# Patient Record
Sex: MALE | Employment: STUDENT | URBAN - METROPOLITAN AREA
[De-identification: names, ages, dates, MRNs, and addresses within clinical notes are randomized per-mention and may not be internally consistent; named-entity substitution may affect disease eponyms.]

---
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
## 2018-02-03 ENCOUNTER — HOSPITAL ENCOUNTER (EMERGENCY)
Facility: CLINIC | Age: 18
Discharge: HOME | End: 2018-02-03
Attending: EMERGENCY MEDICINE | Admitting: EMERGENCY MEDICINE
Payer: COMMERCIAL

## 2018-02-03 VITALS
SYSTOLIC BLOOD PRESSURE: 117 MMHG | TEMPERATURE: 99.4 F | HEART RATE: 73 BPM | WEIGHT: 190 LBS | DIASTOLIC BLOOD PRESSURE: 48 MMHG | OXYGEN SATURATION: 94 % | RESPIRATION RATE: 18 BRPM | BODY MASS INDEX: 21.98 KG/M2 | HEIGHT: 78 IN

## 2018-02-03 DIAGNOSIS — J10.1 INFLUENZA A: ICD-10-CM

## 2018-02-03 LAB
DEPRECATED S PYO AG THROAT QL EIA: NORMAL
FLUAV+FLUBV AG SPEC QL: NEGATIVE
FLUAV+FLUBV AG SPEC QL: POSITIVE
SPECIMEN SOURCE: ABNORMAL
SPECIMEN SOURCE: NORMAL

## 2018-02-03 PROCEDURE — 87880 STREP A ASSAY W/OPTIC: CPT | Performed by: EMERGENCY MEDICINE

## 2018-02-03 PROCEDURE — 99284 EMERGENCY DEPT VISIT MOD MDM: CPT | Mod: Z6 | Performed by: EMERGENCY MEDICINE

## 2018-02-03 PROCEDURE — 87081 CULTURE SCREEN ONLY: CPT | Performed by: EMERGENCY MEDICINE

## 2018-02-03 PROCEDURE — 25000132 ZZH RX MED GY IP 250 OP 250 PS 637: Performed by: EMERGENCY MEDICINE

## 2018-02-03 PROCEDURE — 99283 EMERGENCY DEPT VISIT LOW MDM: CPT | Performed by: EMERGENCY MEDICINE

## 2018-02-03 PROCEDURE — 87804 INFLUENZA ASSAY W/OPTIC: CPT | Mod: 91 | Performed by: EMERGENCY MEDICINE

## 2018-02-03 RX ORDER — ACETAMINOPHEN 500 MG
1000 TABLET ORAL ONCE
Status: COMPLETED | OUTPATIENT
Start: 2018-02-03 | End: 2018-02-03

## 2018-02-03 RX ORDER — ACETAMINOPHEN 500 MG
500 TABLET ORAL ONCE
Status: DISCONTINUED | OUTPATIENT
Start: 2018-02-03 | End: 2018-02-03

## 2018-02-03 RX ADMIN — ACETAMINOPHEN 1000 MG: 500 TABLET ORAL at 14:02

## 2018-02-03 ASSESSMENT — ENCOUNTER SYMPTOMS
VOICE CHANGE: 0
NAUSEA: 1
TROUBLE SWALLOWING: 0
COUGH: 1
SORE THROAT: 1
CARDIOVASCULAR NEGATIVE: 1
VOMITING: 0

## 2018-02-03 NOTE — ED AVS SNAPSHOT
[facility]    [address] [address]    Phone:  [phone]    Fax:  [phone]                                       Colby Rao   MRN: [id number]    Department:  [facility]   Date of Visit:  2/3/2018           Patient Information     Date Of Birth          2000        Your diagnoses for this visit were:     Influenza A        You were seen by Adam Cam MD.      Follow-up Information     Follow up with [facility].    Specialty:  EMERGENCY MEDICINE    Why:  If symptoms worsen    Contact information:    [address] [place] [address]  [phone]    Additional information:    The medical center is located at   [address] [place] [address]. (between [address]   [address] in [place], four miles north   of [place]).        Discharge Instructions         The Flu (Influenza)     The virus that causes the flu spreads through the air in droplets when someone who has the flu coughs, sneezes, laughs, or talks.   The flu (influenza) is an infection that affects your respiratory tract. This tract is made up of your mouth, nose, and lungs, and the passages between them. Unlike a cold, the flu can make you very ill. And it can lead to pneumonia, a serious lung infection. The flu can have serious complications and even cause death.  Who is at risk for the flu?  Anyone can get the flu. But you are more likely to become infected if you:    Have a weakened immune system    Work in a healthcare setting where you may be exposed to flu germs    Live or work with someone who has the flu    Haven t had an annual flu shot  How does the flu spread?  The flu is caused by a virus. The virus spreads through the air in droplets when someone who has the flu coughs, sneezes, laughs, or talks. You can become infected when you inhale these viruses directly. You can also become infected when you touch a surface on which  the droplets have landed and then transfer the germs to your eyes, nose, or mouth. Touching used tissues, or sharing utensils, drinking glasses, or a toothbrush from an infected person can expose you to flu viruses, too.  What are the symptoms of the flu?  Flu symptoms tend to come on quickly and may last a few days to a few weeks. They include:    Fever usually higher than 100.4 F  (38 C) and chills    Sore throat and headache    Dry cough    Runny nose    Tiredness and weakness    Muscle aches  Who is at risk for flu complications?  For some people, the flu can be very serious. The risk for complications is greater for:    Children younger than age 5    Adults ages 65 and older    People with a chronic illness such as diabetes or heart, kidney, or lung disease    People who live in a nursing home or long-term care facility   How is the flu treated?  The flu usually gets better after 7 days or so. In some cases, your healthcare provider may prescribe an antiviral medicine. This may help you get well a little sooner. For the medicine to help, you need to take it as soon as possible (ideally within 48 hours) after your symptoms start. If you develop pneumonia or other serious illness, you may need to stay in the hospital.  Easing flu symptoms    Drink lots of fluids such as water, juice, and warm soup. A good rule is to drink enough so that you urinate your normal amount.    Get plenty of rest.    Ask your healthcare provider what to take for fever and pain.    Call your provider if your fever is 100.4 F (38 C) or higher, or you become dizzy, lightheaded, or short of breath.  Taking steps to protect others    Wash your hands often, especially after coughing or sneezing. Or clean your hands with an alcohol-based hand  containing at least 60% alcohol.    Cough or sneeze into a tissue. Then throw the tissue away and wash your hands. If you don t have a tissue, cough and sneeze into your elbow.    Stay home  until at least 24 hours after you no longer have a fever or chills. Be sure the fever isn t being hidden by fever-reducing medicine.    Don t share food, utensils, drinking glasses, or a toothbrush with others.    Ask your healthcare provider if others in your household should get antiviral medicine to help them avoid infection.  How can the flu be prevented?    One of the best ways to avoid the flu is to get a flu vaccine each year. The virus that causes the flu changes from year to year. For that reason, healthcare providers recommend getting the flu vaccine each year, as soon as it's available in your area. The vaccine is given as a shot. Your healthcare provider can tell you which vaccine is right for you. A nasal spray is also available but is not recommended for the 1736-2519 flu season. The CDC says this is because the nasal spray did not seem to protect against the flu over the last several flu seasons. In the past, it was meant for people ages 2 to 49.    Wash your hands often. Frequent handwashing is a proven way to help prevent infection.    Carry an alcohol-based hand gel containing at least 60% alcohol. Use it when you can't use soap and water. Then wash your hands as soon as you can.    Avoid touching your eyes, nose, and mouth.    At home and work, clean phones, computer keyboards, and toys often with disinfectant wipes.    If possible, avoid close contact with others who have the flu or symptoms of the flu.  Handwashing tips  Handwashing is one of the best ways to prevent many common infections. If you are caring for or visiting someone with the flu, wash your hands each time you enter and leave the room. Follow these steps:    Use warm water and plenty of soap. Rub your hands together well.    Clean the whole hand, including under your nails, between your fingers, and up the wrists.    Wash for at least 15 seconds.    Rinse, letting the water run down your fingers, not up your wrists.    Dry your  hands well. Use a paper towel to turn off the faucet and open the door.  Using alcohol-based hand   Alcohol-based hand  are also a good choice. Use them when you can't use soap and water. Follow these steps:    Squeeze about a tablespoon of gel into the palm of one hand.    Rub your hands together briskly, cleaning the backs of your hands, the palms, between your fingers, and up the wrists.    Rub until the gel is gone and your hands are completely dry.  Preventing the flu in healthcare settings  The flu is a special concern for people in hospitals and long-term care facilities. To help prevent the spread of flu, many hospitals and nursing homes take these steps:    Healthcare providers wash their hands or use an alcohol-based hand  before and after treating each patient.    People with the flu have private rooms and bathrooms or share a room with someone with the same infection.    People who are at high risk for the flu but don't have it are encouraged to get the flu and pneumonia vaccines.    All healthcare workers are encouraged or required to get flu shots.   Date Last Reviewed: 12/1/2016 2000-2017 [employer]. [address] [place] [address]. All rights reserved. This information is not intended as a substitute for professional medical care. Always follow your healthcare professional's instructions.          24 Hour Appointment Hotline       To make an appointment at any [facility], call [phone] ([phone]). If you don't have a family doctor or clinic, we will help you find one. [facility] are conveniently located to serve the needs of you and your family.             Review of your medicines      Our records show that you are taking the medicines listed below. If these are incorrect, please call your family doctor or clinic.        Dose / Directions Last dose taken    loratadine 10 MG tablet   Commonly known as:  CLARITIN   Dose:  10 mg         Take 10 mg by mouth daily as needed Reported on 2/13/2017   Refills:  0        MULTIVITAMIN PO   Dose:  1 tablet        Take 1 tablet by mouth   Refills:  0        OMEGA-3 FISH OIL PO   Dose:  2 capsule        Take 2 capsules by mouth daily   Refills:  0        VITAMIN D (CHOLECALCIFEROL) PO        Take by mouth daily 1 capsule   Refills:  0                Procedures and tests performed during your visit     Beta strep group A culture    Influenza A/B antigen    Rapid Strep Screen      Orders Needing Specimen Collection     None      Pending Results     Date and Time Order Name Status Description    2/3/2018 1310 Beta strep group A culture In process             Pending Culture Results     Date and Time Order Name Status Description    2/3/2018 1310 Beta strep group A culture In process             Pending Results Instructions     If you had any lab results that were not finalized at the time of your Discharge, you can call the ED Lab Result RN at [phone]. You will be contacted by this team for any positive Lab results or changes in treatment. The nurses are available 7 days a week from 10A to 6:30P.  You can leave a message 24 hours per day and they will return your call.        Test Results From Your Hospital Stay        2/3/2018  2:04 PM      Component Results     Component    Specimen Description    Throat    Rapid Strep A Screen    NEGATIVE: No Group A streptococcal antigen detected by immunoassay, await culture report.         2/3/2018  2:13 PM      Component Results     Component Value Ref Range & Units Status    Influenza A/B Agn Specimen Nasal  Final    Influenza A Positive (A) NEG^Negative Final    Influenza B Negative NEG^Negative Final    Test results must be correlated with clinical data. If necessary, results   should be confirmed by a molecular assay or viral culture.           2/3/2018  2:23 PM                Thank you for choosing [facility]       Thank you for choosing [facility] for your  "care. Our goal is always to provide you with excellent care. Hearing back from our patients is one way we can continue to improve our services. Please take a few minutes to complete the written survey that you may receive in the mail after you visit with us. Thank you!        [facility] [facility] Information     [facility] [facility] lets you send messages to your doctor, view your test results, renew your prescriptions, schedule appointments and more. To sign up, go to [email or web address] . Click on \"Log in\" on the left side of the screen, which will take you to the Welcome page. Then click on \"Sign up Now\" on the right side of the page.     You will be asked to enter the access code listed below, as well as some personal information. Please follow the directions to create your username and password.     Your access code is: [email or web address]  Expires: 2018  3:05 PM     Your access code will  in 90 days. If you need help or a new code, please call your [place] clinic or [phone].        Care EveryWhere ID     This is your Care EveryWhere ID. This could be used by other organizations to access your [place] medical records  [id number]        Equal Access to Services     NIIK LOPEZ AH: Mumtaz Ford, sean krishna, joshua richardson, tulio aguillon. So [facility] [phone].    ATENCIÓN: Si habla español, tiene a pino disposición servicios gratuitos de asistencia lingüística. Willa al [phone].    We comply with applicable federal civil rights laws and [place] laws. We do not discriminate on the basis of race, color, national origin, age, disability, sex, sexual orientation, or gender identity.            After Visit Summary       This is your record. Keep this with you and show to your community pharmacist(s) and doctor(s) at your next visit.                  "

## 2018-02-03 NOTE — ED AVS SNAPSHOT
[facility]    [address] [address]    Phone:  [phone]    Fax:  [phone]                                       Colby Rao   MRN: [id number]    Department:  [facility]   Date of Visit:  2/3/2018           After Visit Summary Signature Page     I have received my discharge instructions, and my questions have been answered. I have discussed any challenges I see with this plan with the nurse or doctor.    ..........................................................................................................................................  Patient/Patient Representative Signature      ..........................................................................................................................................  Patient Representative Print Name and Relationship to Patient    ..................................................               ................................................  Date                                            Time    ..........................................................................................................................................  Reviewed by Signature/Title    ...................................................              ..............................................  Date                                                            Time

## 2018-02-03 NOTE — ED PROVIDER NOTES
"  History     Chief Complaint   Patient presents with     Dizziness     patient feels weak and dizzy.  fevers.  naueated.  cough      Pharyngitis     HPI   History per patient and family member.  Colby Rao is a 17 year old male with several days of sore throat, mild nonproductive cough, fevers, nausea, generalized weakness and malaise.  Primary symptom or discomfort is a sore throat.  He has no voice change, drooling or difficulty swallowing.  He has a sibling has recently been ill with URI symptoms.  No other known infectious exposure and no recent travel.  No other pertinent history or acute complaints or concerns.    Problem List:    There are no active problems to display for this patient.       Past Medical History:    History reviewed. No pertinent past medical history.    Past Surgical History:    History reviewed. No pertinent surgical history.    Family History:    Family History   Problem Relation Age of Onset     Unknown/Adopted Maternal Grandmother      Unknown/Adopted Maternal Grandfather      Unknown/Adopted Paternal Grandmother      Other Cancer Paternal Grandfather      skin cancer       Social History:  Marital Status:  Single [1]  Social History   Substance Use Topics     Smoking status: Never Smoker     Smokeless tobacco: Never Used     Alcohol use No        Medications:      VITAMIN D, CHOLECALCIFEROL, PO   Omega-3 Fatty Acids (OMEGA-3 FISH OIL PO)   Multiple Vitamins-Minerals (MULTIVITAMIN OR)   loratadine (CLARITIN) 10 MG tablet       Review of Systems   HENT: Positive for congestion and sore throat. Negative for drooling, trouble swallowing and voice change.    Respiratory: Positive for cough.    Cardiovascular: Negative.    Gastrointestinal: Positive for nausea. Negative for vomiting.   Skin: Negative.        Physical Exam   BP: 117/48  Pulse: 73  Temp: 99.4  F (37.4  C)  Resp: 18  Height: 198.1 cm (6' 6\")  Weight: 86.2 kg (190 lb)  SpO2: 94 %      Physical Exam   Constitutional: He is " oriented to person, place, and time. He appears well-developed and well-nourished. No distress.   HENT:   Head: Normocephalic and atraumatic.   No trismus or airway compromise. Posterior pharyngeal erythema. No PTA.     Eyes: Conjunctivae and EOM are normal. No scleral icterus.   Neck: Normal range of motion. Neck supple. No tracheal deviation present.   Cardiovascular: Normal rate, regular rhythm and normal heart sounds.  Exam reveals no gallop and no friction rub.    No murmur heard.  Pulmonary/Chest: Effort normal and breath sounds normal. No stridor. No respiratory distress. He has no wheezes. He has no rales.   Musculoskeletal: Normal range of motion. He exhibits no edema.   Lymphadenopathy:     He has cervical adenopathy ( Small, anterior, benign).   Neurological: He is alert and oriented to person, place, and time.   Skin: Skin is warm and dry. No rash noted. He is not diaphoretic. No erythema. No pallor.   Psychiatric: He has a normal mood and affect. His behavior is normal.   Nursing note and vitals reviewed.      ED Course     ED Course     Procedures          Results for orders placed or performed during the hospital encounter of 02/03/18 (from the past 24 hour(s))   Rapid Strep Screen   Result Value Ref Range    Specimen Description Throat     Rapid Strep A Screen       NEGATIVE: No Group A streptococcal antigen detected by immunoassay, await culture report.   Influenza A/B antigen   Result Value Ref Range    Influenza A/B Agn Specimen Nasal     Influenza A Positive (A) NEG^Negative    Influenza B Negative NEG^Negative            Assessments & Plan (with Medical Decision Making)   Uncomplicated influenza A.  Rapid strep test negative.  Instructed on supportive care.  Out of the window for Tamiflu therapy. Patient was provided instructions for supportive care and will return as needed for worsened condition or worsening symptoms, or new problems or concerns.    I have reviewed the nursing notes.    I have  reviewed the findings, diagnosis, plan and need for follow up with the patient.    New Prescriptions    Ibuprofen 600 mg po q 6 hrs prn (OTC)         Final diagnoses:   Influenza A       2/3/2018   [facility] EMERGENCY DEPARTMENT     Adam Cam MD  02/03/18 9220

## 2018-02-03 NOTE — DISCHARGE INSTRUCTIONS
The Flu (Influenza)     The virus that causes the flu spreads through the air in droplets when someone who has the flu coughs, sneezes, laughs, or talks.   The flu (influenza) is an infection that affects your respiratory tract. This tract is made up of your mouth, nose, and lungs, and the passages between them. Unlike a cold, the flu can make you very ill. And it can lead to pneumonia, a serious lung infection. The flu can have serious complications and even cause death.  Who is at risk for the flu?  Anyone can get the flu. But you are more likely to become infected if you:    Have a weakened immune system    Work in a healthcare setting where you may be exposed to flu germs    Live or work with someone who has the flu    Haven t had an annual flu shot  How does the flu spread?  The flu is caused by a virus. The virus spreads through the air in droplets when someone who has the flu coughs, sneezes, laughs, or talks. You can become infected when you inhale these viruses directly. You can also become infected when you touch a surface on which the droplets have landed and then transfer the germs to your eyes, nose, or mouth. Touching used tissues, or sharing utensils, drinking glasses, or a toothbrush from an infected person can expose you to flu viruses, too.  What are the symptoms of the flu?  Flu symptoms tend to come on quickly and may last a few days to a few weeks. They include:    Fever usually higher than 100.4 F  (38 C) and chills    Sore throat and headache    Dry cough    Runny nose    Tiredness and weakness    Muscle aches  Who is at risk for flu complications?  For some people, the flu can be very serious. The risk for complications is greater for:    Children younger than age 5    Adults ages 65 and older    People with a chronic illness such as diabetes or heart, kidney, or lung disease    People who live in a nursing home or long-term care facility   How is the flu treated?  The flu usually gets  better after 7 days or so. In some cases, your healthcare provider may prescribe an antiviral medicine. This may help you get well a little sooner. For the medicine to help, you need to take it as soon as possible (ideally within 48 hours) after your symptoms start. If you develop pneumonia or other serious illness, you may need to stay in the hospital.  Easing flu symptoms    Drink lots of fluids such as water, juice, and warm soup. A good rule is to drink enough so that you urinate your normal amount.    Get plenty of rest.    Ask your healthcare provider what to take for fever and pain.    Call your provider if your fever is 100.4 F (38 C) or higher, or you become dizzy, lightheaded, or short of breath.  Taking steps to protect others    Wash your hands often, especially after coughing or sneezing. Or clean your hands with an alcohol-based hand  containing at least 60% alcohol.    Cough or sneeze into a tissue. Then throw the tissue away and wash your hands. If you don t have a tissue, cough and sneeze into your elbow.    Stay home until at least 24 hours after you no longer have a fever or chills. Be sure the fever isn t being hidden by fever-reducing medicine.    Don t share food, utensils, drinking glasses, or a toothbrush with others.    Ask your healthcare provider if others in your household should get antiviral medicine to help them avoid infection.  How can the flu be prevented?    One of the best ways to avoid the flu is to get a flu vaccine each year. The virus that causes the flu changes from year to year. For that reason, healthcare providers recommend getting the flu vaccine each year, as soon as it's available in your area. The vaccine is given as a shot. Your healthcare provider can tell you which vaccine is right for you. A nasal spray is also available but is not recommended for the 1432-1197 flu season. The CDC says this is because the nasal spray did not seem to protect against the flu  over the last several flu seasons. In the past, it was meant for people ages 2 to 49.    Wash your hands often. Frequent handwashing is a proven way to help prevent infection.    Carry an alcohol-based hand gel containing at least 60% alcohol. Use it when you can't use soap and water. Then wash your hands as soon as you can.    Avoid touching your eyes, nose, and mouth.    At home and work, clean phones, computer keyboards, and toys often with disinfectant wipes.    If possible, avoid close contact with others who have the flu or symptoms of the flu.  Handwashing tips  Handwashing is one of the best ways to prevent many common infections. If you are caring for or visiting someone with the flu, wash your hands each time you enter and leave the room. Follow these steps:    Use warm water and plenty of soap. Rub your hands together well.    Clean the whole hand, including under your nails, between your fingers, and up the wrists.    Wash for at least 15 seconds.    Rinse, letting the water run down your fingers, not up your wrists.    Dry your hands well. Use a paper towel to turn off the faucet and open the door.  Using alcohol-based hand   Alcohol-based hand  are also a good choice. Use them when you can't use soap and water. Follow these steps:    Squeeze about a tablespoon of gel into the palm of one hand.    Rub your hands together briskly, cleaning the backs of your hands, the palms, between your fingers, and up the wrists.    Rub until the gel is gone and your hands are completely dry.  Preventing the flu in healthcare settings  The flu is a special concern for people in hospitals and long-term care facilities. To help prevent the spread of flu, many hospitals and nursing homes take these steps:    Healthcare providers wash their hands or use an alcohol-based hand  before and after treating each patient.    People with the flu have private rooms and bathrooms or share a room with someone  with the same infection.    People who are at high risk for the flu but don't have it are encouraged to get the flu and pneumonia vaccines.    All healthcare workers are encouraged or required to get flu shots.   Date Last Reviewed: 12/1/2016 2000-2017 [employer]. [address] [place] [address]. All rights reserved. This information is not intended as a substitute for professional medical care. Always follow your healthcare professional's instructions.

## 2018-02-03 NOTE — ED NOTES
Pt reports sore throat, body ache, low grade fevers, nasal drainage since Wednesday.   Pt able to keep fluids down.   Pt reports a little nausea this am, had some fig newtons with no difficulty.   Offered pt zofran, pt declined at this time.   Mother at bedside.

## 2018-02-05 LAB
BACTERIA SPEC CULT: NORMAL
Lab: NORMAL
SPECIMEN SOURCE: NORMAL

## 2018-12-13 ENCOUNTER — OFFICE VISIT (OUTPATIENT)
Dept: FAMILY MEDICINE | Facility: CLINIC | Age: 18
End: 2018-12-13
Payer: COMMERCIAL

## 2018-12-13 VITALS
SYSTOLIC BLOOD PRESSURE: 118 MMHG | RESPIRATION RATE: 12 BRPM | TEMPERATURE: 98.1 F | HEIGHT: 77 IN | DIASTOLIC BLOOD PRESSURE: 78 MMHG | HEART RATE: 80 BPM | WEIGHT: 206.4 LBS | OXYGEN SATURATION: 99 % | BODY MASS INDEX: 24.37 KG/M2

## 2018-12-13 DIAGNOSIS — M25.50 MULTIPLE JOINT PAIN: Primary | ICD-10-CM

## 2018-12-13 DIAGNOSIS — Z00.00 ENCOUNTER FOR ROUTINE ADULT HEALTH EXAMINATION WITHOUT ABNORMAL FINDINGS: ICD-10-CM

## 2018-12-13 DIAGNOSIS — Z11.3 SCREENING EXAMINATION FOR STD (SEXUALLY TRANSMITTED DISEASE): ICD-10-CM

## 2018-12-13 DIAGNOSIS — R07.9 CHEST PAIN, UNSPECIFIED TYPE: ICD-10-CM

## 2018-12-13 DIAGNOSIS — B35.9 TINEA: ICD-10-CM

## 2018-12-13 LAB
CRP SERPL-MCNC: <2.9 MG/L (ref 0–8)
ERYTHROCYTE [SEDIMENTATION RATE] IN BLOOD BY WESTERGREN METHOD: 2 MM/H (ref 0–15)

## 2018-12-13 PROCEDURE — 87491 CHLMYD TRACH DNA AMP PROBE: CPT | Performed by: FAMILY MEDICINE

## 2018-12-13 PROCEDURE — 87591 N.GONORRHOEAE DNA AMP PROB: CPT | Performed by: FAMILY MEDICINE

## 2018-12-13 PROCEDURE — 86140 C-REACTIVE PROTEIN: CPT | Performed by: FAMILY MEDICINE

## 2018-12-13 PROCEDURE — 99395 PREV VISIT EST AGE 18-39: CPT | Performed by: FAMILY MEDICINE

## 2018-12-13 PROCEDURE — 85652 RBC SED RATE AUTOMATED: CPT | Performed by: FAMILY MEDICINE

## 2018-12-13 PROCEDURE — 86431 RHEUMATOID FACTOR QUANT: CPT | Performed by: FAMILY MEDICINE

## 2018-12-13 PROCEDURE — 36415 COLL VENOUS BLD VENIPUNCTURE: CPT | Performed by: FAMILY MEDICINE

## 2018-12-13 PROCEDURE — 99213 OFFICE O/P EST LOW 20 MIN: CPT | Mod: 25 | Performed by: FAMILY MEDICINE

## 2018-12-13 RX ORDER — BETAMETHASONE DIPROPIONATE 0.5 MG/G
CREAM TOPICAL 2 TIMES DAILY
Qty: 80 G | Refills: 1 | Status: SHIPPED | OUTPATIENT
Start: 2018-12-13 | End: 2019-12-13

## 2018-12-13 RX ORDER — CLOTRIMAZOLE 1 %
CREAM (GRAM) TOPICAL 2 TIMES DAILY
Qty: 80 G | Refills: 1 | Status: SHIPPED | OUTPATIENT
Start: 2018-12-13 | End: 2019-12-13

## 2018-12-13 ASSESSMENT — MIFFLIN-ST. JEOR: SCORE: 2072.56

## 2018-12-13 NOTE — NURSING NOTE
"Initial /78   Pulse 80   Temp 98.1  F (36.7  C) (Tympanic)   Resp 12   Ht 1.962 m (6' 5.25\")   Wt 93.6 kg (206 lb 6.4 oz)   SpO2 99%   BMI 24.32 kg/m   Estimated body mass index is 24.32 kg/m  as calculated from the following:    Height as of this encounter: 1.962 m (6' 5.25\").    Weight as of this encounter: 93.6 kg (206 lb 6.4 oz). .      "

## 2018-12-13 NOTE — LETTER
December 17, 2018      Colby Rao  [address] [facility] [address]        Dear ,    We are writing to inform you of your test results.    Your test results fall within the expected range(s) or remain unchanged from previous results.  Please continue with current treatment plan.    Resulted Orders   Rheumatoid factor   Result Value Ref Range    Rheumatoid Factor <20 <20 IU/mL   **ESR FUTURE anytime   Result Value Ref Range    Sed Rate 2 0 - 15 mm/h   CRP, inflammation   Result Value Ref Range    CRP Inflammation <2.9 0.0 - 8.0 mg/L   NEISSERIA GONORRHOEA PCR   Result Value Ref Range    Specimen Descrip Urine     N Gonorrhea PCR Negative NEG^Negative      Comment:      Negative for N. gonorrhoeae rRNA by transcription mediated amplification.  A negative result by transcription mediated amplification does not preclude   the presence of N. gonorrhoeae infection because results are dependent on   proper and adequate collection, absence of inhibitors, and sufficient rRNA to   be detected.     CHLAMYDIA TRACHOMATIS PCR   Result Value Ref Range    Specimen Description Urine     Chlamydia Trachomatis PCR Negative NEG^Negative      Comment:      Negative for C. trachomatis rRNA by transcription mediated amplification.  A negative result by transcription mediated amplification does not preclude   the presence of C. trachomatis infection because results are dependent on   proper and adequate collection, absence of inhibitors, and sufficient rRNA to   be detected.         If you have any questions or concerns, please call the clinic at the number listed above.       Sincerely,        Heather Bueno MD/bmc

## 2018-12-13 NOTE — PROGRESS NOTES
SUBJECTIVE:   CC: Colby Rao is an 17 year old male who presents for preventive health visit.       17 yr old mal here for his annual physical . He reports that he had a number of issues that he will like to discuss . Patient says that some times he will get stiff joints to the point that they start to hurt. 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    He will also like to be tested for STDs  He has a rash on his torso that has been there for months. He reports no itching and no irritation. He has not changed soaps or detergents.    Talked about chest pain that he has on occasions. Sharp like lats only a few seconds. Sometimes he is short of breath. Denies any palpitations with the chest pain . Has been going on for some time .      Healthy Habits:    Do you get at least three servings of calcium containing foods daily (dairy, green leafy vegetables, etc.)? yes    Amount of exercise or daily activities, outside of work: 5 day(s) per week. Lifts weights and plays hockey.    Problems taking medications regularly No    Medication side effects: No    Have you had an eye exam in the past two years? no    Do you see a dentist twice per year? yes    Do you have sleep apnea, excessive snoring or daytime drowsiness?no     Rash like spot on right upper leg. Has had similar spots on body in the past.    Sharp shooting pain in center of chest, does not correlate with activity.    Stiff joints.    STD check.    Issues putting on weight.      Today's PHQ-2 Score:   PHQ-2 ( 1999 Pfizer) 12/13/2018 4/19/2017   Q1: Little interest or pleasure in doing things 0 0   Q2: Feeling down, depressed or hopeless 1 0   PHQ-2 Score 1 0       Abuse: Current or Past(Physical, Sexual or Emotional)- No  Do you feel safe in your environment? Yes    Social History     Tobacco Use     Smoking status:  "Never Smoker     Smokeless tobacco: Never Used   Substance Use Topics     Alcohol use: Yes     Comment: Socially     If you drink alcohol do you typically have >3 drinks per day or >7 drinks per week? No                      Last PSA: No results found for: PSA    Reviewed orders with patient. Reviewed health maintenance and updated orders accordingly - Yes  Labs reviewed in EPIC    Reviewed and updated as needed this visit by clinical staff  Tobacco  Allergies  Meds  Problems  Med Hx  Surg Hx  Fam Hx  Soc Hx          Reviewed and updated as needed this visit by Provider  Tobacco  Allergies  Meds  Problems  Med Hx  Surg Hx  Fam Hx        History reviewed. No pertinent past medical history.   History reviewed. No pertinent surgical history.    ROS:  CONSTITUTIONAL: NEGATIVE for fever, chills, change in weight  INTEGUMENTARY/SKIN: POSITIVE for rash lower legs bilateral  EYES: NEGATIVE for vision changes or irritation  ENT: NEGATIVE for ear, mouth and throat problems  RESP: NEGATIVE for significant cough or SOB  CV: NEGATIVE for chest pain, palpitations or peripheral edema  GI: NEGATIVE for nausea, abdominal pain, heartburn, or change in bowel habits   male: negative for dysuria, hematuria, decreased urinary stream, erectile dysfunction, urethral discharge  MUSCULOSKELETAL:POSITIVE  for arthralgias generalized stiff joints   NEURO: NEGATIVE for weakness, dizziness or paresthesias  PSYCHIATRIC: NEGATIVE for changes in mood or affect    OBJECTIVE:   /78   Pulse 80   Temp 98.1  F (36.7  C) (Tympanic)   Resp 12   Ht 1.962 m (6' 5.25\")   Wt 93.6 kg (206 lb 6.4 oz)   SpO2 99%   BMI 24.32 kg/m    EXAM:  GENERAL: healthy, alert and no distress  EYES: Eyes grossly normal to inspection, PERRL and conjunctivae and sclerae normal  HENT: ear canals and TM's normal, nose and mouth without ulcers or lesions  NECK: no adenopathy, no asymmetry, masses, or scars and thyroid normal to palpation  RESP: lungs " clear to auscultation - no rales, rhonchi or wheezes  CV: regular rate and rhythm, normal S1 S2, no S3 or S4, no murmur, click or rub, no peripheral edema and peripheral pulses strong  ABDOMEN: soft, nontender, no hepatosplenomegaly, no masses and bowel sounds normal  MS: no gross musculoskeletal defects noted, no edema  SKIN: macule - lower legs and torso round and circular appears like tinea   NEURO: Normal strength and tone, mentation intact and speech normal  PSYCH: mentation appears normal, affect normal/bright    Diagnostic Test Results:  none     ASSESSMENT/PLAN:   1. Encounter for routine adult health examination without abnormal findings  Patient doing relatively well , encouraged healthy living     2. Multiple joint pain  Patient will be notified of results  - Rheumatoid factor  - **ESR FUTURE anytime  - CRP, inflammation    3. Tinea  Medication faxed   - clotrimazole (LOTRIMIN) 1 % external cream; Apply topically 2 times daily  Dispense: 80 g; Refill: 1  - augmented betamethasone dipropionate (DIPROLENE-AF) 0.05 % external cream; Apply topically 2 times daily  Dispense: 80 g; Refill: 1    4. Screening examination for STD (sexually transmitted disease)  Patient will be notified of results  - NEISSERIA GONORRHOEA PCR  - CHLAMYDIA TRACHOMATIS PCR    5. Chest pain, unspecified type  Patient reassured, judy differentials include pleurisy. Things to look out for her pressure like chest pain, palpitations. Dizziness , shortness of breath , if these start to occur with the chest pain , patient is encouraged to come back to clinic       COUNSELING:  Reviewed preventive health counseling, as reflected in patient instructions       Regular exercise       Healthy diet/nutrition       Vision screening    BP Readings from Last 1 Encounters:   12/13/18 118/78 (18 %/ 65 %)*     *BP percentiles are based on the August 2017 AAP Clinical Practice Guideline for boys     Estimated body mass index is 24.32 kg/m  as  "calculated from the following:    Height as of this encounter: 1.962 m (6' 5.25\").    Weight as of this encounter: 93.6 kg (206 lb 6.4 oz).           reports that  has never smoked. he has never used smokeless tobacco.      Counseling Resources:  ATP IV Guidelines  Pooled Cohorts Equation Calculator  FRAX Risk Assessment  ICSI Preventive Guidelines  Dietary Guidelines for Americans, 2010  USDA's MyPlate  ASA Prophylaxis  Lung CA Screening    Heather Bueno MD  [facility]  "

## 2018-12-14 LAB
C TRACH DNA SPEC QL NAA+PROBE: NEGATIVE
N GONORRHOEA DNA SPEC QL NAA+PROBE: NEGATIVE
RHEUMATOID FACT SER NEPH-ACNC: <20 IU/ML (ref 0–20)
SPECIMEN SOURCE: NORMAL
SPECIMEN SOURCE: NORMAL

## 2018-12-17 NOTE — RESULT ENCOUNTER NOTE
Please inform patient that test result was within normal parameters.   Thank you.     Heather Bueno M.D.

## 2019-08-16 ENCOUNTER — OFFICE VISIT (OUTPATIENT)
Dept: FAMILY MEDICINE | Facility: CLINIC | Age: 19
End: 2019-08-16
Payer: COMMERCIAL

## 2019-08-16 VITALS
SYSTOLIC BLOOD PRESSURE: 110 MMHG | RESPIRATION RATE: 16 BRPM | WEIGHT: 203 LBS | DIASTOLIC BLOOD PRESSURE: 60 MMHG | HEART RATE: 80 BPM | TEMPERATURE: 97.7 F | BODY MASS INDEX: 23.97 KG/M2 | OXYGEN SATURATION: 96 % | HEIGHT: 77 IN

## 2019-08-16 DIAGNOSIS — Z23 NEED FOR VACCINATION: ICD-10-CM

## 2019-08-16 DIAGNOSIS — Z00.00 ROUTINE ADULT HEALTH MAINTENANCE: Primary | ICD-10-CM

## 2019-08-16 DIAGNOSIS — Z11.3 ROUTINE SCREENING FOR STI (SEXUALLY TRANSMITTED INFECTION): ICD-10-CM

## 2019-08-16 LAB — HIV 1+2 AB+HIV1 P24 AG SERPL QL IA: NONREACTIVE

## 2019-08-16 PROCEDURE — 90620 MENB-4C VACCINE IM: CPT | Performed by: INTERNAL MEDICINE

## 2019-08-16 PROCEDURE — 87591 N.GONORRHOEAE DNA AMP PROB: CPT | Performed by: INTERNAL MEDICINE

## 2019-08-16 PROCEDURE — 87389 HIV-1 AG W/HIV-1&-2 AB AG IA: CPT | Performed by: INTERNAL MEDICINE

## 2019-08-16 PROCEDURE — 90471 IMMUNIZATION ADMIN: CPT | Performed by: INTERNAL MEDICINE

## 2019-08-16 PROCEDURE — 36415 COLL VENOUS BLD VENIPUNCTURE: CPT | Performed by: INTERNAL MEDICINE

## 2019-08-16 PROCEDURE — 87491 CHLMYD TRACH DNA AMP PROBE: CPT | Performed by: INTERNAL MEDICINE

## 2019-08-16 PROCEDURE — 90651 9VHPV VACCINE 2/3 DOSE IM: CPT | Performed by: INTERNAL MEDICINE

## 2019-08-16 PROCEDURE — 99395 PREV VISIT EST AGE 18-39: CPT | Mod: 25 | Performed by: INTERNAL MEDICINE

## 2019-08-16 PROCEDURE — 90472 IMMUNIZATION ADMIN EACH ADD: CPT | Performed by: INTERNAL MEDICINE

## 2019-08-16 RX ORDER — CREATINE 100 %
5 POWDER (GRAM) MISCELLANEOUS
COMMUNITY

## 2019-08-16 ASSESSMENT — MIFFLIN-ST. JEOR: SCORE: 2055.32

## 2019-08-16 ASSESSMENT — PAIN SCALES - GENERAL: PAINLEVEL: NO PAIN (0)

## 2019-08-16 NOTE — PROGRESS NOTES
"SUBJECTIVE:   CC: Colby Rao is an 18 year old male who presents for preventative health visit.     HPI            Today's PHQ-2 Score:   PHQ-2 ( 1999 Pfizer) 8/16/2019   Q1: Little interest or pleasure in doing things 0   Q2: Feeling down, depressed or hopeless 0   PHQ-2 Score 0       Abuse: Current or Past(Physical, Sexual or Emotional)- No  Do you feel safe in your environment? Yes    Social History     Tobacco Use     Smoking status: Never Smoker     Smokeless tobacco: Never Used   Substance Use Topics     Alcohol use: Yes     Comment: Socially     If you drink alcohol do you typically have >3 drinks per day or >7 drinks per week? No    No flowsheet data found.    Last PSA: No results found for: PSA    Reviewed orders with patient. Reviewed health maintenance and updated orders accordingly - { :372445::\"Yes\"}  {Chronicprobdata (optional):154861}    Reviewed and updated as needed this visit by clinical staff  Tobacco  Allergies  Meds  Med Hx  Surg Hx  Fam Hx  Soc Hx        Reviewed and updated as needed this visit by Provider        {HISTORY OPTIONS (Optional):499084}    Review of Systems  {MALE ROS (Optional):522442::\"CONSTITUTIONAL: NEGATIVE for fever, chills, change in weight\",\"INTEGUMENTARY/SKIN: NEGATIVE for worrisome rashes, moles or lesions\",\"EYES: NEGATIVE for vision changes or irritation\",\"ENT: NEGATIVE for ear, mouth and throat problems\",\"RESP: NEGATIVE for significant cough or SOB\",\"CV: NEGATIVE for chest pain, palpitations or peripheral edema\",\"GI: NEGATIVE for nausea, abdominal pain, heartburn, or change in bowel habits\",\" male: negative for dysuria, hematuria, decreased urinary stream, erectile dysfunction, urethral discharge\",\"MUSCULOSKELETAL: NEGATIVE for significant arthralgias or myalgia\",\"NEURO: NEGATIVE for weakness, dizziness or paresthesias\",\"PSYCHIATRIC: NEGATIVE for changes in mood or affect\"}    OBJECTIVE:   /60 (BP Location: Right arm, Patient Position: Sitting, Cuff " "Size: Adult Large)   Pulse 80   Temp 97.7  F (36.5  C) (Tympanic)   Resp 16   Ht 1.967 m (6' 5.45\")   Wt 92.1 kg (203 lb)   SpO2 96%   BMI 23.79 kg/m      Physical Exam  {Exam Choices (Optional):603096}    {Diagnostic Test Results (Optional):923856::\"Diagnostic Test Results:\",\"Labs reviewed in Epic\"}    ASSESSMENT/PLAN:   {Diag Picklist:057799}    COUNSELING:   {MALE COUNSELING MESSAGES:125454::\"Reviewed preventive health counseling, as reflected in patient instructions\"}    Estimated body mass index is 23.79 kg/m  as calculated from the following:    Height as of this encounter: 1.967 m (6' 5.45\").    Weight as of this encounter: 92.1 kg (203 lb).     {Weight Management Plan (ACO) Complete if BMI is abnormal-  Ages 18-64  BMI >24.9.  Age 65+ with BMI <23 or >30 (Optional):652607}     reports that he has never smoked. He has never used smokeless tobacco.  {Tobacco Cessation -- Complete if patient is a smoker (Optional):097575}    Counseling Resources:  ATP IV Guidelines  Pooled Cohorts Equation Calculator  FRAX Risk Assessment  ICSI Preventive Guidelines  Dietary Guidelines for Americans, 2010  USDA's MyPlate  ASA Prophylaxis  Lung CA Screening    Humberto Antoine MD  [facility]  "

## 2019-08-16 NOTE — PROGRESS NOTES
SUBJECTIVE:   CC: Colby Rao is an 18 year old male who presents for preventative health visit.     Healthy Habits:     Getting at least 3 servings of Calcium per day:  Yes    Bi-annual eye exam:  NO    Dental care twice a year:  Yes    Sleep apnea or symptoms of sleep apnea:  None    Diet:  Breakfast skipped and Regular (no restrictions)    Frequency of exercise:  6-7 days/week    Duration of exercise:  Greater than 60 minutes    Taking medications regularly:  No    Barriers to taking medications:  Not applicable    Medication side effects:  Not applicable    PHQ-2 Total Score: 0    Additional concerns today:  No      Needs Men B vaccine for school.     Today's PHQ-2 Score:   PHQ-2 ( 1999 Pfizer) 8/16/2019   Q1: Little interest or pleasure in doing things 0   Q2: Feeling down, depressed or hopeless 0   PHQ-2 Score 0       Abuse: Current or Past(Physical, Sexual or Emotional)- No  Do you feel safe in your environment? Yes    Social History     Tobacco Use     Smoking status: Never Smoker     Smokeless tobacco: Never Used   Substance Use Topics     Alcohol use: Yes     Comment: Socially     If you drink alcohol do you typically have >3 drinks per day or >7 drinks per week? No    No flowsheet data found.    Last PSA: No results found for: PSA    Reviewed orders with patient. Reviewed health maintenance and updated orders accordingly - Yes  There is no problem list on file for this patient.    History reviewed. No pertinent surgical history.    Social History     Tobacco Use     Smoking status: Never Smoker     Smokeless tobacco: Never Used   Substance Use Topics     Alcohol use: Yes     Comment: Socially     Family History   Problem Relation Age of Onset     Unknown/Adopted Maternal Grandmother      Unknown/Adopted Maternal Grandfather      Unknown/Adopted Paternal Grandmother      Other Cancer Paternal Grandfather         skin cancer         Current Outpatient Medications   Medication Sig Dispense Refill      "augmented betamethasone dipropionate (DIPROLENE-AF) 0.05 % external cream Apply topically 2 times daily 80 g 1     clotrimazole (LOTRIMIN) 1 % external cream Apply topically 2 times daily 80 g 1     Creatine POWD 5 g/g       loratadine (CLARITIN) 10 MG tablet Take 10 mg by mouth daily as needed Reported on 2/13/2017       Multiple Vitamins-Minerals (MULTIVITAMIN OR) Take 1 tablet by mouth       Omega-3 Fatty Acids (OMEGA-3 FISH OIL PO) Take 2 capsules by mouth daily        VITAMIN D, CHOLECALCIFEROL, PO Take by mouth daily 1 capsule       No Known Allergies    Reviewed and updated as needed this visit by clinical staff  Tobacco  Allergies  Meds  Med Hx  Surg Hx  Fam Hx  Soc Hx        Reviewed and updated as needed this visit by Provider          Review of Systems  CONSTITUTIONAL: NEGATIVE for fever, chills, change in weight  INTEGUMENTARY/SKIN: NEGATIVE for worrisome rashes, moles or lesions  EYES: NEGATIVE for vision changes or irritation  ENT: NEGATIVE for ear, mouth and throat problems except for intermittent external ear pain on both side that lasts for about a day and self resolves  RESP: NEGATIVE for significant cough or SOB  CV: NEGATIVE for peripheral edema.  Positive for occasion brief chest pain that has been previously evaluated  GI: NEGATIVE for nausea, abdominal pain, heartburn, or change in bowel habits   male: negative for dysuria, hematuria, decreased urinary stream, erectile dysfunction, urethral discharge  MUSCULOSKELETAL: Positive from some diffuse joint pains   NEURO: NEGATIVE for weakness, dizziness or paresthesias  PSYCHIATRIC: NEGATIVE for changes in mood or affect    OBJECTIVE:   /60 (BP Location: Right arm, Patient Position: Sitting, Cuff Size: Adult Large)   Pulse 80   Temp 97.7  F (36.5  C) (Tympanic)   Resp 16   Ht 1.967 m (6' 5.45\")   Wt 92.1 kg (203 lb)   SpO2 96%   BMI 23.79 kg/m      Physical Exam  GENERAL: healthy, alert and no distress  EYES: Eyes grossly normal " "to inspection, PERRL and conjunctivae and sclerae normal  HENT: ear canals and TM's normal, nose and mouth without ulcers or lesions  NECK: no adenopathy, no asymmetry, masses, or scars and thyroid normal to palpation  RESP: lungs clear to auscultation - no rales, rhonchi or wheezes  CV: regular rate and rhythm, normal S1 S2, no S3 or S4, no murmur, click or rub, no peripheral edema and peripheral pulses strong  ABDOMEN: soft, nontender, no hepatosplenomegaly, no masses and bowel sounds normal   (male): declined  MS: no gross musculoskeletal defects noted, no edema  SKIN: no suspicious lesions or rashes, likely tinea versicolor on the back- slightly hyperpigmented macules scattered on upper back  NEURO: Normal strength and tone, mentation intact and speech normal  PSYCH: mentation appears normal, affect normal/bright        ASSESSMENT/PLAN:   1. Routine adult health maintenance      Immunizations: He needs Men B vaccine for school and would like to start the HPV series    Lab Studies: he would like to do STI screening       2. Routine screening for STI (sexually transmitted infection)    - HIV Antigen Antibody Combo  - NEISSERIA GONORRHOEA PCR  - CHLAMYDIA TRACHOMATIS PCR    3. Need for vaccination    - MENINGOCOCCAL RP W/O MV VACCINE 2 DOSE IM (BEXSERO) [05957]  - HUMAN PAPILLOMA VIRUS (GARDASIL 9) VACCINE [23292]  - 1st  Administration  [[id number]]  - Each additional admin.  (Right click and add QUANTITY)  [[id number]]    COUNSELING:   Reviewed preventive health counseling, as reflected in patient instructions    Estimated body mass index is 23.79 kg/m  as calculated from the following:    Height as of this encounter: 1.967 m (6' 5.45\").    Weight as of this encounter: 92.1 kg (203 lb).      reports that he has never smoked. He has never used smokeless tobacco.      Humberto Antoine MD  [facility] - IM  "

## 2019-08-18 LAB
C TRACH DNA SPEC QL NAA+PROBE: NEGATIVE
N GONORRHOEA DNA SPEC QL NAA+PROBE: NEGATIVE
SPECIMEN SOURCE: NORMAL
SPECIMEN SOURCE: NORMAL

## 2019-11-06 ENCOUNTER — HEALTH MAINTENANCE LETTER (OUTPATIENT)
Age: 19
End: 2019-11-06

## 2020-01-30 ENCOUNTER — TELEPHONE (OUTPATIENT)
Dept: FAMILY MEDICINE | Facility: CLINIC | Age: 20
End: 2020-01-30

## 2020-01-30 DIAGNOSIS — Z00.00 ROUTINE ADULT HEALTH MAINTENANCE: Primary | ICD-10-CM

## 2020-01-30 NOTE — TELEPHONE ENCOUNTER
"Reason for Call:  Letter    Detailed comments: Patient mom Crisys is calling stating that patient needs a referral to be seen out of insurance network while patient is at school at [employer].  Mom states that the closest clinic that is in network is an hour away.  Letter needs to state that   \"Colby will be receiving care at [facility] while attending college at [employer].\"   Insurance is [facility].   Fax #  [phone]    Phone Number Patient can be reached at: Colby's number [phone].  Mom was told that we would have to call Colby back with any questions as she is not in his chart to speak with.      Best Time: Any    Can we leave a detailed message on this number? YES    Call taken on 1/30/2020 at 9:06 AM by Perla Strange    "

## 2020-01-30 NOTE — TELEPHONE ENCOUNTER
It is not clear to me what is needed?  A referral or a letter?  This seems to be an insurance issue regarding access to care.  What specifically is needed?    Left non-detailed message for mom to return a call to the clinic [facility].   KATHRYN Bragg RN

## 2020-02-03 NOTE — TELEPHONE ENCOUNTER
OK to issue a referral in [facility]. Please state on referral information given on why they request it as closest network provider is an hour away and fax to number provided. This is not a guarantee that they will approve it. We can only request. Patient should contact [facility] to see if approved.    Stephanie  Referral Coordinator

## 2020-02-05 NOTE — TELEPHONE ENCOUNTER
I have never seen this patient so I cannot complete.    I suggest that this be signed by a provider who has seen this patient.      Los Lennon MD  Family Medicine

## 2020-02-05 NOTE — TELEPHONE ENCOUNTER
Patient's mother wants referral for [facility] in [place], for patient she doesn't think we understand that. Patient's mother would like a call back. Please call Aliza Mims [phone]

## 2020-02-05 NOTE — TELEPHONE ENCOUNTER
"Mom will get name of hospital/clinic for referral.  She states, \"I dont know, the hospital there in [place].\"  RN explained insurance will likely need a specific location on the referral for insurance/billing purposes.  She states she will get name of hospital - we can then update pended referral.      CSS - please get name of hospital/clinic when mom calls back.    Not certain why Dr. Lennon is listed as PCP and encounter opened for Dr. Lennon when he has not seen patient.  Patient last seen by Dr. Antoine so will send this request to her for approval.    Amanda SULLIVAN RN    "

## 2020-02-05 NOTE — TELEPHONE ENCOUNTER
Family practice referral ready.  Patient at [employer] [facility] [employer] [facility], closest in network provider is over an hr away.  Referral for insurance stating due to location, ok to see out of network provider.    Ok with this?    Routing to provider.  Amanda SULLIVAN RN

## 2020-02-10 NOTE — TELEPHONE ENCOUNTER
Referral signed, though seems like there should be a student health services at his school that he should be able to see without a referral or insurance approval.    Humberto Antoine MD

## 2020-02-10 NOTE — TELEPHONE ENCOUNTER
Patient's mother requesting a referral for patient. Patient is away at school and the closest clinic is over 1 hour away. Referral is for insurance consideration.      LEONOR RichN, RN

## 2020-02-10 NOTE — TELEPHONE ENCOUNTER
Mother called back stated that is is [employer] [address]  Ph. [phone]    Renee Robledo on 2/10/2020 at 9:18 AM

## 2020-02-10 NOTE — TELEPHONE ENCOUNTER
CSS - please get name of hospital/clinic when mom calls back.  Left message for patient to return call to clinic.     LEONOR LaN, RN

## 2020-02-10 NOTE — TELEPHONE ENCOUNTER
Patient's mother notified of Dr. Antoine's message. Also advised to contact patient's insurance regarding coverage/payment.      LEONOR RichN, RN

## 2020-11-29 ENCOUNTER — HEALTH MAINTENANCE LETTER (OUTPATIENT)
Age: 20
End: 2020-11-29

## 2021-06-01 ENCOUNTER — OFFICE VISIT (OUTPATIENT)
Dept: FAMILY MEDICINE | Facility: CLINIC | Age: 21
End: 2021-06-01
Payer: COMMERCIAL

## 2021-06-01 VITALS
HEIGHT: 77 IN | HEART RATE: 71 BPM | RESPIRATION RATE: 16 BRPM | WEIGHT: 212.6 LBS | TEMPERATURE: 97.8 F | DIASTOLIC BLOOD PRESSURE: 66 MMHG | SYSTOLIC BLOOD PRESSURE: 110 MMHG | BODY MASS INDEX: 25.1 KG/M2 | OXYGEN SATURATION: 97 %

## 2021-06-01 DIAGNOSIS — Z00.00 ROUTINE GENERAL MEDICAL EXAMINATION AT A HEALTH CARE FACILITY: Primary | ICD-10-CM

## 2021-06-01 DIAGNOSIS — Z79.890 ON HORMONE REPLACEMENT THERAPY: ICD-10-CM

## 2021-06-01 PROCEDURE — 90715 TDAP VACCINE 7 YRS/> IM: CPT | Performed by: FAMILY MEDICINE

## 2021-06-01 PROCEDURE — 90651 9VHPV VACCINE 2/3 DOSE IM: CPT | Performed by: FAMILY MEDICINE

## 2021-06-01 PROCEDURE — 36415 COLL VENOUS BLD VENIPUNCTURE: CPT | Performed by: FAMILY MEDICINE

## 2021-06-01 PROCEDURE — 84403 ASSAY OF TOTAL TESTOSTERONE: CPT | Mod: 90 | Performed by: FAMILY MEDICINE

## 2021-06-01 PROCEDURE — 90471 IMMUNIZATION ADMIN: CPT | Performed by: FAMILY MEDICINE

## 2021-06-01 PROCEDURE — 99000 SPECIMEN HANDLING OFFICE-LAB: CPT | Performed by: FAMILY MEDICINE

## 2021-06-01 PROCEDURE — 99395 PREV VISIT EST AGE 18-39: CPT | Mod: 25 | Performed by: FAMILY MEDICINE

## 2021-06-01 PROCEDURE — 90472 IMMUNIZATION ADMIN EACH ADD: CPT | Performed by: FAMILY MEDICINE

## 2021-06-01 PROCEDURE — 84270 ASSAY OF SEX HORMONE GLOBUL: CPT | Performed by: FAMILY MEDICINE

## 2021-06-01 ASSESSMENT — MIFFLIN-ST. JEOR: SCORE: 2081.73

## 2021-06-01 NOTE — LETTER
Namita 3, 2021      Colby Rao  [id number] [facility] [id number]        Dear ,    We are writing to inform you of your test results.    Your test results fall within the expected range(s) or remain unchanged from previous results.  Please continue with current treatment plan.    Resulted Orders   Testosterone Free and Total   Result Value Ref Range    Testosterone Total 728 240 - 950 ng/dL      Comment:      This test was developed and its performance characteristics determined by the   [facility].   It has not been cleared or approved by the FDA. The laboratory is regulated   under CLIA as qualified to perform high-complexity testing. This test is used   for clinical purposes. It should not be regarded as investigational or for   research.      Sex Hormone Binding Globulin 37 11 - 80 nmol/L    Free Testosterone Calculated 15.28 4.7 - 24.4 ng/dL       If you have any questions or concerns, please call the clinic at the number listed above.       Sincerely,      Heather Bueno MD

## 2021-06-01 NOTE — PROGRESS NOTES
SUBJECTIVE:   CC: Colby Rao is an 20 year old male who presents for preventative health visit      Patient is a 20 yr old male here for his annual physical. He is relatively healthy and reports no acute symptoms. He reports that he is taking Tamoxifen for joint pain and he is also on osteon also for joint pain. This is an alternative form of treatment.    He is requesting a level of Testerone because he is on this medication..     Patient has been advised of split billing requirements and indicates understanding: Yes  Healthy Habits:    Getting at least 3 servings of Calcium per day:  Yes    Bi-annual eye exam:  NO    Dental care twice a year:  NO    Sleep apnea or symptoms of sleep apnea:  None    Diet:  Regular (no restrictions)    Frequency of exercise:  4-5 days/week    Duration of exercise:  Greater than 60 minutes    Taking medications regularly:  Not Applicable    Barriers to taking medications:  Not applicable    Medication side effects:  Not applicable    PHQ-2 Total Score:    Additional concerns today:  Yes (Spot on left thigh. Family hx of skin cancer.)  - Patient wanting blood work. He just did oustaren and is current on tamoxifen.          Today's PHQ-2 Score:   PHQ-2 ( 1999 Pfizer) 6/1/2021   Q1: Little interest or pleasure in doing things 0   Q2: Feeling down, depressed or hopeless 0   PHQ-2 Score 0       Abuse: Current or Past(Physical, Sexual or Emotional)- No  Do you feel safe in your environment? Yes    Have you ever done Advance Care Planning? (For example, a Health Directive, POLST, or a discussion with a medical provider or your loved ones about your wishes): No, advance care planning information given to patient to review.  Patient declined advance care planning discussion at this time.    Social History     Tobacco Use     Smoking status: Never Smoker     Smokeless tobacco: Never Used   Substance Use Topics     Alcohol use: Yes     Comment: Socially     If you drink alcohol do you  typically have >3 drinks per day or >7 drinks per week? No    No flowsheet data found.    Last PSA: No results found for: PSA    Reviewed orders with patient. Reviewed health maintenance and updated orders accordingly - Yes  Lab work is in process  BP Readings from Last 3 Encounters:   06/01/21 110/66   08/16/19 110/60   12/13/18 118/78    Wt Readings from Last 3 Encounters:   06/01/21 96.4 kg (212 lb 9.6 oz)   08/16/19 92.1 kg (203 lb)   12/13/18 93.6 kg (206 lb 6.4 oz) (94 %, Z= 1.55)*     * Growth percentiles are based on [facility] (Boys, 2-20 Years) data.                  There is no problem list on file for this patient.    History reviewed. No pertinent surgical history.    Social History     Tobacco Use     Smoking status: Never Smoker     Smokeless tobacco: Never Used   Substance Use Topics     Alcohol use: Yes     Comment: Socially     Family History   Problem Relation Age of Onset     Unknown/Adopted Maternal Grandmother      Unknown/Adopted Maternal Grandfather      Unknown/Adopted Paternal Grandmother      Other Cancer Paternal Grandfather         skin cancer         Current Outpatient Medications   Medication Sig Dispense Refill     Multiple Vitamins-Minerals (MULTIVITAMIN OR) Take 1 tablet by mouth       VITAMIN D, CHOLECALCIFEROL, PO Take by mouth daily 1 capsule       Creatine POWD 5 g/g       No Known Allergies    Reviewed and updated as needed this visit by clinical staff  Tobacco  Allergies  Meds   Med Hx  Surg Hx  Fam Hx  Soc Hx        Reviewed and updated as needed this visit by Provider    Meds             History reviewed. No pertinent past medical history.   History reviewed. No pertinent surgical history.    Review of Systems  CONSTITUTIONAL: NEGATIVE for fever, chills, change in weight  INTEGUMENTARY/SKIN: NEGATIVE for worrisome rashes, moles or lesions  EYES: NEGATIVE for vision changes or irritation  ENT: NEGATIVE for ear, mouth and throat problems  RESP: NEGATIVE for significant cough  "or SOB  CV: NEGATIVE for chest pain, palpitations or peripheral edema  GI: NEGATIVE for nausea, abdominal pain, heartburn, or change in bowel habits   male: negative for dysuria, hematuria, decreased urinary stream, erectile dysfunction, urethral discharge  MUSCULOSKELETAL: NEGATIVE for significant arthralgias or myalgia  NEURO: NEGATIVE for weakness, dizziness or paresthesias  PSYCHIATRIC: NEGATIVE for changes in mood or affect    OBJECTIVE:   /66   Pulse 71   Temp 97.8  F (36.6  C) (Tympanic)   Resp 16   Ht 1.956 m (6' 5\")   Wt 96.4 kg (212 lb 9.6 oz)   SpO2 97%   BMI 25.21 kg/m      Physical Exam  GENERAL: healthy, alert and no distress  EYES: Eyes grossly normal to inspection, PERRL and conjunctivae and sclerae normal  HENT: ear canals and TM's normal, nose and mouth without ulcers or lesions  NECK: no adenopathy, no asymmetry, masses, or scars and thyroid normal to palpation  RESP: lungs clear to auscultation - no rales, rhonchi or wheezes  CV: regular rate and rhythm, normal S1 S2, no S3 or S4, no murmur, click or rub, no peripheral edema and peripheral pulses strong  ABDOMEN: soft, nontender, no hepatosplenomegaly, no masses and bowel sounds normal  MS: no gross musculoskeletal defects noted, no edema  SKIN: no suspicious lesions or rashes  PSYCH: mentation appears normal, affect normal/bright    Diagnostic Test Results:  Pending.    ASSESSMENT/PLAN:       ICD-10-CM    1. Routine general medical examination at a health care facility  Z00.00    2. On hormone replacement therapy  Z79.890 Testosterone Free and Total       Patient has been advised of split billing requirements and indicates understanding: Yes  COUNSELING:   Reviewed preventive health counseling, as reflected in patient instructions       Regular exercise       Healthy diet/nutrition       Vision screening    Estimated body mass index is 25.21 kg/m  as calculated from the following:    Height as of this encounter: 1.956 m (6' 5\").   "  Weight as of this encounter: 96.4 kg (212 lb 9.6 oz).         He reports that he has never smoked. He has never used smokeless tobacco.      Counseling Resources:  ATP IV Guidelines  Pooled Cohorts Equation Calculator  FRAX Risk Assessment  ICSI Preventive Guidelines  Dietary Guidelines for Americans, 2010  USDA's MyPlate  ASA Prophylaxis  Lung CA Screening    Heather Bueno MD  [facility]

## 2021-06-03 LAB
SHBG SERPL-SCNC: 37 NMOL/L (ref 11–80)
TESTOST FREE SERPL-MCNC: 15.28 NG/DL (ref 4.7–24.4)
TESTOST SERPL-MCNC: 728 NG/DL (ref 240–950)

## 2021-09-19 ENCOUNTER — HEALTH MAINTENANCE LETTER (OUTPATIENT)
Age: 21
End: 2021-09-19

## 2022-08-21 ENCOUNTER — HEALTH MAINTENANCE LETTER (OUTPATIENT)
Age: 22
End: 2022-08-21

## 2022-11-21 ENCOUNTER — HEALTH MAINTENANCE LETTER (OUTPATIENT)
Age: 22
End: 2022-11-21